# Patient Record
Sex: FEMALE | Race: WHITE | NOT HISPANIC OR LATINO | URBAN - METROPOLITAN AREA
[De-identification: names, ages, dates, MRNs, and addresses within clinical notes are randomized per-mention and may not be internally consistent; named-entity substitution may affect disease eponyms.]

---

## 2017-02-16 ENCOUNTER — OFFICE VISIT (OUTPATIENT)
Dept: URGENT CARE | Facility: URGENT CARE | Age: 57
End: 2017-02-16
Payer: COMMERCIAL

## 2017-02-16 VITALS
HEART RATE: 106 BPM | TEMPERATURE: 99.4 F | HEIGHT: 65 IN | SYSTOLIC BLOOD PRESSURE: 135 MMHG | DIASTOLIC BLOOD PRESSURE: 95 MMHG | BODY MASS INDEX: 27.82 KG/M2 | WEIGHT: 167 LBS | OXYGEN SATURATION: 97 %

## 2017-02-16 DIAGNOSIS — R10.84 ABDOMINAL PAIN, GENERALIZED: Primary | ICD-10-CM

## 2017-02-16 PROBLEM — Z85.3 HX: BREAST CANCER: Status: ACTIVE | Noted: 2017-02-16

## 2017-02-16 PROBLEM — M51.369 DDD (DEGENERATIVE DISC DISEASE), LUMBAR: Status: ACTIVE | Noted: 2017-02-16

## 2017-02-16 PROBLEM — R53.82 CHRONIC FATIGUE: Status: ACTIVE | Noted: 2017-02-16

## 2017-02-16 PROBLEM — M15.0 PRIMARY OSTEOARTHRITIS INVOLVING MULTIPLE JOINTS: Status: ACTIVE | Noted: 2017-02-16

## 2017-02-16 PROBLEM — I10 BENIGN ESSENTIAL HYPERTENSION: Status: ACTIVE | Noted: 2017-02-16

## 2017-02-16 LAB
ALBUMIN UR-MCNC: NEGATIVE MG/DL
APPEARANCE UR: CLEAR
BASOPHILS # BLD AUTO: 0.1 10E9/L (ref 0–0.2)
BASOPHILS NFR BLD AUTO: 0.8 %
BILIRUB UR QL STRIP: NEGATIVE
COLOR UR AUTO: YELLOW
DIFFERENTIAL METHOD BLD: NORMAL
EOSINOPHIL # BLD AUTO: 0.2 10E9/L (ref 0–0.7)
EOSINOPHIL NFR BLD AUTO: 3.1 %
ERYTHROCYTE [DISTWIDTH] IN BLOOD BY AUTOMATED COUNT: 14 % (ref 10–15)
GLUCOSE UR STRIP-MCNC: NEGATIVE MG/DL
HCT VFR BLD AUTO: 40.4 % (ref 35–47)
HGB BLD-MCNC: 13.4 G/DL (ref 11.7–15.7)
HGB UR QL STRIP: NEGATIVE
KETONES UR STRIP-MCNC: NEGATIVE MG/DL
LEUKOCYTE ESTERASE UR QL STRIP: NEGATIVE
LYMPHOCYTES # BLD AUTO: 1.1 10E9/L (ref 0.8–5.3)
LYMPHOCYTES NFR BLD AUTO: 17.2 %
MCH RBC QN AUTO: 30.9 PG (ref 26.5–33)
MCHC RBC AUTO-ENTMCNC: 33.2 G/DL (ref 31.5–36.5)
MCV RBC AUTO: 93 FL (ref 78–100)
MONOCYTES # BLD AUTO: 0.6 10E9/L (ref 0–1.3)
MONOCYTES NFR BLD AUTO: 9.7 %
NEUTROPHILS # BLD AUTO: 4.5 10E9/L (ref 1.6–8.3)
NEUTROPHILS NFR BLD AUTO: 69.2 %
NITRATE UR QL: NEGATIVE
PH UR STRIP: 6 PH (ref 5–7)
PLATELET # BLD AUTO: 265 10E9/L (ref 150–450)
RBC # BLD AUTO: 4.33 10E12/L (ref 3.8–5.2)
SP GR UR STRIP: 1.02 (ref 1–1.03)
URN SPEC COLLECT METH UR: NORMAL
UROBILINOGEN UR STRIP-ACNC: 0.2 EU/DL (ref 0.2–1)
WBC # BLD AUTO: 6.5 10E9/L (ref 4–11)

## 2017-02-16 PROCEDURE — 80053 COMPREHEN METABOLIC PANEL: CPT | Performed by: PHYSICIAN ASSISTANT

## 2017-02-16 PROCEDURE — 36415 COLL VENOUS BLD VENIPUNCTURE: CPT | Performed by: PHYSICIAN ASSISTANT

## 2017-02-16 PROCEDURE — 81003 URINALYSIS AUTO W/O SCOPE: CPT | Performed by: PHYSICIAN ASSISTANT

## 2017-02-16 PROCEDURE — 85025 COMPLETE CBC W/AUTO DIFF WBC: CPT | Performed by: PHYSICIAN ASSISTANT

## 2017-02-16 PROCEDURE — 83690 ASSAY OF LIPASE: CPT | Performed by: PHYSICIAN ASSISTANT

## 2017-02-16 PROCEDURE — 99214 OFFICE O/P EST MOD 30 MIN: CPT | Performed by: PHYSICIAN ASSISTANT

## 2017-02-16 RX ORDER — PYRIDOXINE HCL (VITAMIN B6) 50 MG
50 TABLET ORAL
COMMUNITY

## 2017-02-16 RX ORDER — LISINOPRIL/HYDROCHLOROTHIAZIDE 10-12.5 MG
1 TABLET ORAL
COMMUNITY

## 2017-02-16 RX ORDER — CALCIUM CARBONATE/VITAMIN D3 500-10/5ML
LIQUID (ML) ORAL
COMMUNITY

## 2017-02-16 RX ORDER — MONTELUKAST SODIUM 10 MG/1
10 TABLET ORAL
COMMUNITY

## 2017-02-16 RX ORDER — SENNOSIDES 8.6 MG
CAPSULE ORAL
COMMUNITY

## 2017-02-16 RX ORDER — DILTIAZEM HYDROCHLORIDE 120 MG/1
120 CAPSULE, EXTENDED RELEASE ORAL
COMMUNITY

## 2017-02-16 RX ORDER — LORAZEPAM 1 MG/1
1 TABLET ORAL
COMMUNITY

## 2017-02-16 RX ORDER — LIDOCAINE 50 MG/G
3 PATCH TOPICAL
COMMUNITY

## 2017-02-16 RX ORDER — DEXTROAMPHETAMINE SACCHARATE, AMPHETAMINE ASPARTATE, DEXTROAMPHETAMINE SULFATE AND AMPHETAMINE SULFATE 2.5; 2.5; 2.5; 2.5 MG/1; MG/1; MG/1; MG/1
10 TABLET ORAL
COMMUNITY

## 2017-02-16 RX ORDER — MIRTAZAPINE 45 MG/1
45 TABLET, FILM COATED ORAL
COMMUNITY

## 2017-02-16 RX ORDER — OMEGA-3 FATTY ACIDS/FISH OIL 300-1000MG
200 CAPSULE ORAL
COMMUNITY
Start: 2012-06-27

## 2017-02-16 RX ORDER — MIRTAZAPINE 30 MG/1
45 TABLET, FILM COATED ORAL
COMMUNITY
Start: 2014-12-06

## 2017-02-16 RX ORDER — GABAPENTIN 100 MG/1
200 CAPSULE ORAL
COMMUNITY

## 2017-02-16 RX ORDER — ACETAMINOPHEN 500 MG
500 TABLET ORAL
COMMUNITY
Start: 2012-06-27

## 2017-02-16 RX ORDER — DIAZEPAM 5 MG
5 TABLET ORAL
COMMUNITY

## 2017-02-16 RX ORDER — TRAMADOL HYDROCHLORIDE 50 MG/1
50 TABLET ORAL
COMMUNITY

## 2017-02-16 RX ORDER — OXYCODONE AND ACETAMINOPHEN 5; 325 MG/1; MG/1
1-2 TABLET ORAL
COMMUNITY
Start: 2015-01-15

## 2017-02-16 RX ORDER — LOSARTAN POTASSIUM 25 MG/1
25 TABLET ORAL
COMMUNITY

## 2017-02-16 RX ORDER — DILTIAZEM HYDROCHLORIDE 120 MG/1
120 CAPSULE, COATED, EXTENDED RELEASE ORAL
COMMUNITY
Start: 2016-12-21

## 2017-02-16 ASSESSMENT — ENCOUNTER SYMPTOMS
FOCAL WEAKNESS: 0
FEVER: 0
HEADACHES: 0
BLOOD IN STOOL: 0
FREQUENCY: 0
HEMATURIA: 0
DIARRHEA: 0
NAUSEA: 0
DYSURIA: 0
CONSTIPATION: 0
SHORTNESS OF BREATH: 0
CHILLS: 0
ABDOMINAL PAIN: 1
VOMITING: 0

## 2017-02-16 NOTE — MR AVS SNAPSHOT
After Visit Summary   2/16/2017    Vania Puga    MRN: [id number]           Patient Information     Date Of Birth          1960        Visit Information        Provider Department      2/16/2017 5:15 PM Sharon Nielsen PA-C [facility]        Today's Diagnoses     Abdominal pain, generalized    -  1      Care Instructions      Follow up with primary care in 3-4 days if symptoms have not improved. Return to clinic or go to ER if symptoms worsen.    Abdominal Pain  Abdominal pain is pain in the stomach or intestinal area. Everyone has this pain from time to time. In many cases it goes away on its own. But abdominal pain can sometimes be due to a serious problem, such as appendicitis. So it s important to know when to seek help.  Causes of abdominal pain  There are many possible causes of abdominal pain. Common causes in adults include:    Constipation, diarrhea, or gas    GERD (gastroesophageal reflux disease) movement of stomach acid into the esophagus, also known as acid reflux or heartburn    Peptic ulcer (a sore in the lining of the stomach or small intestine)    Inflammation of the gallbladder, liver, or pancreas    Gallstones or kidney stones    Appendicitis     Obstruction of the intestines     Hernia (bulging of an internal organ through a muscle or other tissue)    Urinary tract infections    In women, menstrual cramps, fibroids, or endometriosis of the uterus    Inflammation or infection of the intestines  Diagnosing the cause of abdominal pain  Your health care provider will examine you to help find the cause of your pain. If needed, tests will be ordered. Because abdominal pain has so many possible causes, it can be hard to discover the reason for the pain. Giving details about your pain can help. Be ready to tell your health care provider where and when you feel the pain and what makes it better or worse. Also mention whether you have other symptoms  such as fever, tiredness, nausea, vomiting, or changes in bathroom habits.  Treating abdominal pain  Certain causes of pain, such as appendicitis or a bowel obstruction, need emergency treatment. Other problems can be treated with rest, fluids, or medications. Your health care provider can give you specific instructions for treatment or self-care based on the cause of your pain.  If you have vomiting or diarrhea, sip water or other clear fluids. When you are ready to eat solid foods again, start with small amounts of easy-to-digest, low-fat foods, such as applesauce, toast, or crackers.   When to call the doctor  Call 911 or go to the hospital right away if you:    Can t pass stool and are vomiting    Are vomiting blood or have black, tarry diarrhea    Also have chest, neck, or shoulder pain    Feel like you are about to pass out    Have pain in your shoulder blades with nausea    Have sudden, excruciating abdominal pain    Have new, severe pain unlike any you have felt before    Have a belly that is rigid, hard, and tender to touch  Call your doctor if you have:    Pain for more than 5 days    Bloating for more than 2 days    Diarrhea for more than 5 days    Fever of 101 F (38.3 C) or higher    Pain that continues to worsen    Unexplained weight loss    Continued lack of appetite    Blood in the stool  How to prevent abdominal pain  Here are some tips to help prevent abdominal pain:    Eat smaller amounts of food at one time.    Avoid greasy, fried, or other high-fat foods.    Avoid foods that give you gas.    Exercise regularly.    Drink plenty of fluids.  To help prevent symptoms of gastroesophageal reflux disease (GERD):    Quit smoking.    Reduce alcohol and certain foods that increase stomach acid.     Lose excess weight.    Finish eating at least 2 hours before you go to bed or lie down.    Elevate the head of your bed.    3475-8326 [employer]. [address] [place] [address], [place] [address]. All  "rights reserved. This information is not intended as a substitute for professional medical care. Always follow your healthcare professional's instructions.              Follow-ups after your visit        Follow-up notes from your care team     Return if symptoms worsen or fail to improve.      Who to contact     If you have questions or need follow up information about today's clinic visit or your schedule please contact [facility] directly at [phone].  Normal or non-critical lab and imaging results will be communicated to you by Zarfohart, letter or phone within 4 business days after the clinic has received the results. If you do not hear from us within 7 days, please contact the clinic through Zarfohart or phone. If you have a critical or abnormal lab result, we will notify you by phone as soon as possible.  Submit refill requests through [employer] or call your pharmacy and they will forward the refill request to us. Please allow 3 business days for your refill to be completed.          Additional Information About Your Visit        Zarfohart Information     [employer] lets you send messages to your doctor, view your test results, renew your prescriptions, schedule appointments and more. To sign up, go to [email or web address] . Click on \"Log in\" on the left side of the screen, which will take you to the Welcome page. Then click on \"Sign up Now\" on the right side of the page.     You will be asked to enter the access code listed below, as well as some personal information. Please follow the directions to create your username and password.     Your access code is: [email or web address]  Expires: 2017  6:52 PM     Your access code will  in 90 days. If you need help or a new code, please call your [place] clinic or [phone].        Care EveryWhere ID     This is your Care EveryWhere ID. This could be used by other organizations to access your [place] medical records  [id number]      " "  Your Vitals Were     Pulse Temperature Height Pulse Oximetry BMI (Body Mass Index)       106 99.4  F (37.4  C) (Tympanic) 5' 4.5\" (1.638 m) 97% 28.22 kg/m2        Blood Pressure from Last 3 Encounters:   02/16/17 (!) 135/95   05/05/07 120/90   07/16/06 110/70    Weight from Last 3 Encounters:   02/16/17 167 lb (75.8 kg)              We Performed the Following     *UA reflex to Microscopic and Culture ([facility] (except [place] and [place])     CBC with platelets differential     Comprehensive metabolic panel     Lipase          Today's Medication Changes          These changes are accurate as of: 2/16/17  6:52 PM.  If you have any questions, ask your nurse or doctor.               These medicines have changed or have updated prescriptions.        Dose/Directions    * omeprazole 20 MG CR capsule   Commonly known as:  priLOSEC   This may have changed:  Another medication with the same name was added. Make sure you understand how and when to take each.   Changed by:  Sharon Nielsen PA-C        Dose:  20 mg   Take 20 mg by mouth   Refills:  0       * omeprazole 20 MG CR capsule   Commonly known as:  priLOSEC   This may have changed:  You were already taking a medication with the same name, and this prescription was added. Make sure you understand how and when to take each.   Used for:  Abdominal pain, generalized   Changed by:  Sharon Nielsen PA-C        Dose:  20 mg   Take 1 capsule (20 mg) by mouth daily Take 30 mins before eating.   Quantity:  30 capsule   Refills:  1       * Notice:  This list has 2 medication(s) that are the same as other medications prescribed for you. Read the directions carefully, and ask your doctor or other care provider to review them with you.         Where to get your medicines      These medications were sent to [facility] [id number] [place] [facility] [place], [place] - [address]  [address] [place] [address]     Phone:  [phone]     " omeprazole 20 MG CR capsule                Primary Care Provider    Doctor Unknown, MD       No address on file        Thank you!     Thank you for choosing [facility]  for your care. Our goal is always to provide you with excellent care. Hearing back from our patients is one way we can continue to improve our services. Please take a few minutes to complete the written survey that you may receive in the mail after your visit with us. Thank you!             Your Updated Medication List - Protect others around you: Learn how to safely use, store and throw away your medicines at www.disposemymeds.org.          This list is accurate as of: 2/16/17  6:52 PM.  Always use your most recent med list.                   Brand Name Dispense Instructions for use    * acetaminophen 650 MG CR tablet    TYLENOL         * acetaminophen 500 MG tablet    TYLENOL     Take 500 mg by mouth       acetaminophen-codeine 300-30 MG per tablet    TYLENOL #3     Take 1-2 tablets by mouth       ALLEGRA PO      2 TABLETS TWICE DAILY       amphetamine-dextroamphetamine 10 MG per tablet    ADDERALL     Take 10 mg by mouth       B Complex Tabs          BUSPAR PO      1 TABLET 3 TIMES DAILY       * CENTAMIN PO      None Entered       * zinc Lozg          CHANTIX PO      1 TABLET DAILY       CLONAZEPAM PO      1 TABLET 3 TIMES DAILY       * D 2000 2000 UNITS tablet   Generic drug:  cholecalciferol      Take 2,000 Units by mouth       * cholecalciferol 5000 UNITS Caps capsule    vitamin D3     Take 5,000 Units by mouth       * cholecalciferol 1000 UNIT tablet    vitamin D     Take 2,000 Units by mouth       diazepam 5 MG tablet    VALIUM     Take 5 mg by mouth       diclofenac 1 % Gel topical gel    VOLTAREN         diltiazem 120 MG 24 hr capsule    DILACOR XR     Take 120 mg by mouth       diltiazem 120 MG 24 hr capsule      Take 120 mg by mouth       EFFEXOR PO      1 TABLET 3 TIMES DAILY WITH FOOD       gabapentin 100 MG  capsule    NEURONTIN     Take 200 mg by mouth       Germanium 10 MG Caps          HYDROcodone-acetaminophen  MG per tablet    NORCO    20    TAKE 1 TO 2 TABLETS EVER 4 TO 6 HOURS AS NEEDED FOR PAIN       ibuprofen 200 MG capsule      Take 200 mg by mouth       lidocaine 5 % Patch    LIDODERM     3 patches       lisinopril-hydrochlorothiazide 10-12.5 MG per tablet    PRINZIDE/ZESTORETIC     Take 1 tablet by mouth       LORazepam 1 MG tablet    ATIVAN     Take 1 mg by mouth       losartan 25 MG tablet    COZAAR     Take 25 mg by mouth       magnesium oxide 400 MG Caps          * mirtazapine 45 MG tablet    REMERON     Take 45 mg by mouth       * mirtazapine 30 MG tablet    REMERON     Take 45 mg by mouth       montelukast 10 MG tablet    SINGULAIR     Take 10 mg by mouth       * omeprazole 20 MG CR capsule    priLOSEC     Take 20 mg by mouth       * omeprazole 20 MG CR capsule    priLOSEC    30 capsule    Take 1 capsule (20 mg) by mouth daily Take 30 mins before eating.       oxyCODONE-acetaminophen 5-325 MG per tablet    PERCOCET     Take 1-2 tablets by mouth       * pyridOXINE 50 MG tablet    VITAMIN B-6     Take 50 mg by mouth       * Vitamin B6 200 MG Tabs          SULINDAC      None Entered       traMADol 50 MG tablet    ULTRAM     Take 50 mg by mouth       Zinc Gluconate 15 MG Tabs      Take 54 mg by mouth       * Notice:  This list has 13 medication(s) that are the same as other medications prescribed for you. Read the directions carefully, and ask your doctor or other care provider to review them with you.

## 2017-02-16 NOTE — PROGRESS NOTES
"HPI  February 16, 2017    HPI: Vania Puga is a 57 year old female who complains of moderate *** onset *** days ago. Pain is constant with intermittent worsening in severity. No known aggravating or alleviating factors, not related to food intake. Ibuprofen with mild relief. Pain does not radiate. No hx similar symptoms previously. Symptoms are constant in duration.. Denies urinary sx, dark/bloody stool, fever/chills, CP, SOB, abd pain, N/V/D, rash, or any other symptoms. Patient denies sick contacts. No recent surgeries.     No past medical history on file.  No past surgical history on file.  Social History   Substance Use Topics     Smoking status: Former Smoker     Quit date: 4/5/2007     Smokeless tobacco: Not on file     Alcohol use Not on file       Problem list, Medication list, Allergies, and Medical/Social/Surgical histories reviewed in [facility] and updated as appropriate.    ROS      Physical Exam    Vital Signs  BP (!) 135/95 (BP Location: Left arm, Patient Position: Chair, Cuff Size: Adult Regular)  Pulse 106  Temp 99.4  F (37.4  C) (Tympanic)  Ht 5' 4.5\" (1.638 m)  Wt 167 lb (75.8 kg)  SpO2 97%  BMI 28.22 kg/m2     {Diagnostic Test Results:563941::\"Diagnostic Test Results:\",\"none \"}    ASSESSMENT/PLAN      ICD-10-CM    1. HX: breast cancer Z85.3             I have discussed any lab or imaging results, the patient's diagnosis, and my plan of treatment with the patient and/or family. Patient is aware to come back in if with worsening symptoms or if no relief despite treatment plan.  Patient voiced understanding and had no further questions.       Follow Up: Data Unavailable    KELLEY Gil, PADilipC  [facility]      "

## 2017-02-16 NOTE — NURSING NOTE
"Chief Complaint   Patient presents with     Urgent Care     Abdominal Pain     c/o upper right abdominal pain for 2 days       Initial BP (!) 135/95 (BP Location: Left arm, Patient Position: Chair, Cuff Size: Adult Regular)  Pulse 106  Temp 99.4  F (37.4  C) (Tympanic)  Ht 5' 4.5\" (1.638 m)  Wt 167 lb (75.8 kg)  SpO2 97%  BMI 28.22 kg/m2 Estimated body mass index is 28.22 kg/(m^2) as calculated from the following:    Height as of this encounter: 5' 4.5\" (1.638 m).    Weight as of this encounter: 167 lb (75.8 kg).  Medication Reconciliation: complete   Nelli Yung MA    "

## 2017-02-16 NOTE — PROGRESS NOTES
HPI  February 16, 2017    HPI: Vania Puga is a 57 year old female who complains of moderate R sided mid abdominal pain onset 2 days ago. Symptoms are constant in duration, with occasional periods of increased intensity. Pain is worse with movement, not affected by food intake. Ibuprofen helps the pain. Pt has hx gastritis and has been taking more ibuprofen recently. Pain does not radiate. No hx similar pain previously. Pt had CT scan of abdomen a few months ago for unrelated reason and was found to have gallstones. Denies dark/bloody stool, urinary sx, fever/chills, HA, CP, SOB, N/V/D/C, rash, or any other symptoms. No recent surgeries.    Past Medical History   Diagnosis Date     Breast cancer (H)      in remission- s/p bilateral mastectomy in 2006     Hypertension      Lumbar degenerative disc disease      Past Surgical History   Procedure Laterality Date     Mastectomy, bilateral  2006     Orthopedic surgery       3 on feet     Rhinoplasty       Social History   Substance Use Topics     Smoking status: Former Smoker     Quit date: 4/5/2007     Smokeless tobacco: Not on file     Alcohol use Not on file       Problem list, Medication list, Allergies, and Medical/Social/Surgical histories reviewed in [facility] and updated as appropriate.      Review of Systems   Constitutional: Negative for chills and fever.   Respiratory: Negative for shortness of breath.    Cardiovascular: Negative for chest pain.   Gastrointestinal: Positive for abdominal pain. Negative for blood in stool, constipation, diarrhea, melena, nausea and vomiting.   Genitourinary: Negative for dysuria, frequency, hematuria and urgency.   Skin: Negative for rash.   Neurological: Negative for focal weakness and headaches.   All other systems reviewed and are negative.        Physical Exam   Constitutional: She is oriented to person, place, and time and well-developed, well-nourished, and in no distress.   HENT:   Head: Normocephalic and atraumatic.  "  Cardiovascular: Normal rate, regular rhythm and normal heart sounds.    Pulmonary/Chest: Effort normal and breath sounds normal.   Abdominal: Soft. Normal appearance. She exhibits no distension and no mass. There is tenderness. There is no rigidity, no rebound and no guarding.       Musculoskeletal: Normal range of motion.   Neurological: She is alert and oriented to person, place, and time. Gait normal.   Skin: Skin is warm and dry.   Nursing note and vitals reviewed.    Vital Signs  BP (!) 135/95 (BP Location: Left arm, Patient Position: Chair, Cuff Size: Adult Regular)  Pulse 106  Temp 99.4  F (37.4  C) (Tympanic)  Ht 5' 4.5\" (1.638 m)  Wt 167 lb (75.8 kg)  SpO2 97%  BMI 28.22 kg/m2     Diagnostic Test Results:  Results for orders placed or performed in visit on 02/16/17 (from the past 24 hour(s))   CBC with platelets differential   Result Value Ref Range    WBC 6.5 4.0 - 11.0 10e9/L    RBC Count 4.33 3.8 - 5.2 10e12/L    Hemoglobin 13.4 11.7 - 15.7 g/dL    Hematocrit 40.4 35.0 - 47.0 %    MCV 93 78 - 100 fl    MCH 30.9 26.5 - 33.0 pg    MCHC 33.2 31.5 - 36.5 g/dL    RDW 14.0 10.0 - 15.0 %    Platelet Count 265 150 - 450 10e9/L    Diff Method Automated Method     % Neutrophils 69.2 %    % Lymphocytes 17.2 %    % Monocytes 9.7 %    % Eosinophils 3.1 %    % Basophils 0.8 %    Absolute Neutrophil 4.5 1.6 - 8.3 10e9/L    Absolute Lymphocytes 1.1 0.8 - 5.3 10e9/L    Absolute Monocytes 0.6 0.0 - 1.3 10e9/L    Absolute Eosinophils 0.2 0.0 - 0.7 10e9/L    Absolute Basophils 0.1 0.0 - 0.2 10e9/L   *UA reflex to Microscopic and Culture ([facility] (except [place] and [place])   Result Value Ref Range    Color Urine Yellow     Appearance Urine Clear     Glucose Urine Negative NEG mg/dL    Bilirubin Urine Negative NEG    Ketones Urine Negative NEG mg/dL    Specific Gravity Urine 1.020 1.003 - 1.035    Blood Urine Negative NEG    pH Urine 6.0 5.0 - 7.0 pH    Protein Albumin Urine Negative NEG " mg/dL    Urobilinogen Urine 0.2 0.2 - 1.0 EU/dL    Nitrite Urine Negative NEG    Leukocyte Esterase Urine Negative NEG    Source Midstream Urine        ASSESSMENT/PLAN      ICD-10-CM    1. Abdominal pain, generalized R10.84 CBC with platelets differential     Comprehensive metabolic panel     Lipase     *UA reflex to Microscopic and Culture ([facility] (except [place] and [place])     omeprazole (PRILOSEC) 20 MG CR capsule      No signs of acute abdomen, afebrile, NAD. UA and CBC negative. CMP, lipase pending. Doubt symptoms due to gallbladder- offered to order ultrasound, pt declines. Possibility symptoms due to gastritis, will start omeprazole and avoid NSAIDs. F/u with PCP if no improvement.      I have discussed any lab or imaging results, the patient's diagnosis, and my plan of treatment with the patient and/or family. Patient is aware to come back in if with worsening symptoms or if no relief despite treatment plan.  Patient voiced understanding and had no further questions.       Follow Up: Return if symptoms worsen or fail to improve.    KELLEY Gil, PA-C  [facility]

## 2017-02-17 LAB
ALBUMIN SERPL-MCNC: 3.8 G/DL (ref 3.4–5)
ALP SERPL-CCNC: 121 U/L (ref 40–150)
ALT SERPL W P-5'-P-CCNC: 82 U/L (ref 0–50)
ANION GAP SERPL CALCULATED.3IONS-SCNC: 6 MMOL/L (ref 3–14)
AST SERPL W P-5'-P-CCNC: 26 U/L (ref 0–45)
BILIRUB SERPL-MCNC: 0.3 MG/DL (ref 0.2–1.3)
BUN SERPL-MCNC: 12 MG/DL (ref 7–30)
CALCIUM SERPL-MCNC: 9 MG/DL (ref 8.5–10.1)
CHLORIDE SERPL-SCNC: 105 MMOL/L (ref 94–109)
CO2 SERPL-SCNC: 29 MMOL/L (ref 20–32)
CREAT SERPL-MCNC: 0.86 MG/DL (ref 0.52–1.04)
GFR SERPL CREATININE-BSD FRML MDRD: 68 ML/MIN/1.7M2
GLUCOSE SERPL-MCNC: 92 MG/DL (ref 70–99)
LIPASE SERPL-CCNC: 163 U/L (ref 73–393)
POTASSIUM SERPL-SCNC: 4.3 MMOL/L (ref 3.4–5.3)
PROT SERPL-MCNC: 7.2 G/DL (ref 6.8–8.8)
SODIUM SERPL-SCNC: 140 MMOL/L (ref 133–144)

## 2017-02-17 NOTE — PATIENT INSTRUCTIONS
Follow up with primary care in 3-4 days if symptoms have not improved. Return to clinic or go to ER if symptoms worsen.    Abdominal Pain  Abdominal pain is pain in the stomach or intestinal area. Everyone has this pain from time to time. In many cases it goes away on its own. But abdominal pain can sometimes be due to a serious problem, such as appendicitis. So it s important to know when to seek help.  Causes of abdominal pain  There are many possible causes of abdominal pain. Common causes in adults include:    Constipation, diarrhea, or gas    GERD (gastroesophageal reflux disease) movement of stomach acid into the esophagus, also known as acid reflux or heartburn    Peptic ulcer (a sore in the lining of the stomach or small intestine)    Inflammation of the gallbladder, liver, or pancreas    Gallstones or kidney stones    Appendicitis     Obstruction of the intestines     Hernia (bulging of an internal organ through a muscle or other tissue)    Urinary tract infections    In women, menstrual cramps, fibroids, or endometriosis of the uterus    Inflammation or infection of the intestines  Diagnosing the cause of abdominal pain  Your health care provider will examine you to help find the cause of your pain. If needed, tests will be ordered. Because abdominal pain has so many possible causes, it can be hard to discover the reason for the pain. Giving details about your pain can help. Be ready to tell your health care provider where and when you feel the pain and what makes it better or worse. Also mention whether you have other symptoms such as fever, tiredness, nausea, vomiting, or changes in bathroom habits.  Treating abdominal pain  Certain causes of pain, such as appendicitis or a bowel obstruction, need emergency treatment. Other problems can be treated with rest, fluids, or medications. Your health care provider can give you specific instructions for treatment or self-care based on the cause of your  pain.  If you have vomiting or diarrhea, sip water or other clear fluids. When you are ready to eat solid foods again, start with small amounts of easy-to-digest, low-fat foods, such as applesauce, toast, or crackers.   When to call the doctor  Call 911 or go to the hospital right away if you:    Can t pass stool and are vomiting    Are vomiting blood or have black, tarry diarrhea    Also have chest, neck, or shoulder pain    Feel like you are about to pass out    Have pain in your shoulder blades with nausea    Have sudden, excruciating abdominal pain    Have new, severe pain unlike any you have felt before    Have a belly that is rigid, hard, and tender to touch  Call your doctor if you have:    Pain for more than 5 days    Bloating for more than 2 days    Diarrhea for more than 5 days    Fever of 101 F (38.3 C) or higher    Pain that continues to worsen    Unexplained weight loss    Continued lack of appetite    Blood in the stool  How to prevent abdominal pain  Here are some tips to help prevent abdominal pain:    Eat smaller amounts of food at one time.    Avoid greasy, fried, or other high-fat foods.    Avoid foods that give you gas.    Exercise regularly.    Drink plenty of fluids.  To help prevent symptoms of gastroesophageal reflux disease (GERD):    Quit smoking.    Reduce alcohol and certain foods that increase stomach acid.     Lose excess weight.    Finish eating at least 2 hours before you go to bed or lie down.    Elevate the head of your bed.    5085-4957 [employer]. [address] [place] [address], [place] [address]. All rights reserved. This information is not intended as a substitute for professional medical care. Always follow your healthcare professional's instructions.

## 2017-02-27 NOTE — PROGRESS NOTES
Chart reviewed.  Encounter was not reviewed with provider.  Patient was not examined by me.  Noris Gamez MD

## 2021-05-26 ENCOUNTER — RECORDS - HEALTHEAST (OUTPATIENT)
Dept: ADMINISTRATIVE | Facility: CLINIC | Age: 61
End: 2021-05-26

## 2021-06-16 ENCOUNTER — OFFICE VISIT (OUTPATIENT)
Dept: FAMILY MEDICINE | Facility: CLINIC | Age: 61
End: 2021-06-16
Payer: OTHER MISCELLANEOUS

## 2021-06-16 VITALS
HEART RATE: 82 BPM | WEIGHT: 182.2 LBS | HEIGHT: 65 IN | SYSTOLIC BLOOD PRESSURE: 128 MMHG | DIASTOLIC BLOOD PRESSURE: 62 MMHG | BODY MASS INDEX: 30.35 KG/M2 | OXYGEN SATURATION: 95 % | TEMPERATURE: 98.1 F

## 2021-06-16 DIAGNOSIS — R06.02 SOB (SHORTNESS OF BREATH): Primary | ICD-10-CM

## 2021-06-16 PROCEDURE — 99203 OFFICE O/P NEW LOW 30 MIN: CPT | Performed by: NURSE PRACTITIONER

## 2021-06-16 RX ORDER — TRAMADOL HYDROCHLORIDE 50 MG/1
TABLET ORAL
COMMUNITY
Start: 2021-06-11

## 2021-06-16 RX ORDER — MIRTAZAPINE 45 MG/1
45 TABLET, FILM COATED ORAL
COMMUNITY
Start: 2021-04-29

## 2021-06-16 RX ORDER — ACETAMINOPHEN 500 MG
5 TABLET ORAL
COMMUNITY

## 2021-06-16 RX ORDER — GABAPENTIN 600 MG/1
600 TABLET ORAL
COMMUNITY
Start: 2021-01-22

## 2021-06-16 RX ORDER — ACETAMINOPHEN 500 MG
TABLET ORAL
COMMUNITY
Start: 2021-01-07

## 2021-06-16 RX ORDER — LOPERAMIDE HYDROCHLORIDE 2 MG/1
CAPSULE ORAL
COMMUNITY
Start: 2020-10-22

## 2021-06-16 RX ORDER — MONTELUKAST SODIUM 10 MG/1
TABLET ORAL
COMMUNITY

## 2021-06-16 RX ORDER — AMLODIPINE BESYLATE 5 MG/1
5 TABLET ORAL
COMMUNITY
Start: 2021-04-18

## 2021-06-16 RX ORDER — DICLOFENAC SODIUM 10 MG/G
GEL TOPICAL
COMMUNITY
Start: 2020-09-11

## 2021-06-16 RX ORDER — BUSPIRONE HYDROCHLORIDE 5 MG/1
5 TABLET ORAL
COMMUNITY
Start: 2021-04-29

## 2021-06-16 RX ORDER — HYDROXYZINE HYDROCHLORIDE 10 MG/1
10-20 TABLET, FILM COATED ORAL
COMMUNITY
Start: 2021-04-29

## 2021-06-16 RX ORDER — CYCLOBENZAPRINE HCL 10 MG
10 TABLET ORAL
COMMUNITY
Start: 2020-09-11

## 2021-06-16 RX ORDER — OMEPRAZOLE 10 MG/1
10 CAPSULE, DELAYED RELEASE ORAL
COMMUNITY

## 2021-06-16 RX ORDER — LEVALBUTEROL TARTRATE 45 UG/1
2 AEROSOL, METERED ORAL
COMMUNITY
Start: 2021-04-07

## 2021-06-16 RX ORDER — FLUTICASONE PROPIONATE AND SALMETEROL 250; 50 UG/1; UG/1
1 POWDER RESPIRATORY (INHALATION) 2 TIMES DAILY
Qty: 60 EACH | Refills: 0 | Status: SHIPPED | OUTPATIENT
Start: 2021-06-16

## 2021-06-16 RX ORDER — LOSARTAN POTASSIUM 50 MG/1
50 TABLET ORAL
COMMUNITY
Start: 2021-05-12

## 2021-06-16 ASSESSMENT — ENCOUNTER SYMPTOMS
VOMITING: 0
SHORTNESS OF BREATH: 0
COUGH: 0
ADENOPATHY: 0
SINUS PAIN: 0
ABDOMINAL PAIN: 0
LIGHT-HEADEDNESS: 1
WHEEZING: 0
HEADACHES: 0
RHINORRHEA: 0
DIZZINESS: 0
CHILLS: 0
SORE THROAT: 0
SINUS PRESSURE: 0
FEVER: 0
CHEST TIGHTNESS: 0
NAUSEA: 0

## 2021-06-16 ASSESSMENT — PAIN SCALES - GENERAL: PAINLEVEL: 2

## 2021-06-16 NOTE — PROGRESS NOTES
CLINIC NOTE  6/16/2021  Provider: Doreen Foster CNP    Subjective:  RADHA  Valery Malin is a 61 y.o. female who is in with concerns regarding shortness of breath.  She apparently is in the area meeting her daughter and they are planning to travel together.  She gives a history of Covid in October 2020.  She had pneumonia and sepsis and was quite ill for an extended period of time.  Reports that she has felt short of breath with any exertion since the infection.  She is sleeping okay, denies cough or fever.  When asked about situations that aggravated her shortness of breath she realized that she usually becomes short of breath during hot weather conditions and with humidity.  Oxygen saturation today is 95%.  Per her report had a full evaluation of her shortness of breath with EKG chest x-ray in April with no abnormalities found.  She carries an albuterol inhaler for episodes of acute shortness of breath.  Problem List:  There are no problems to display for this patient.      Current Outpatient Medications on File Prior to Visit   Medication Sig Dispense Refill   • montelukast (SINGULAIR) 10 mg tablet      • acetaminophen (TYLENOL) 500 mg tablet in clinic     • amLODIPine (NORVASC) 5 mg tablet Take 5 mg by mouth     • busPIRone (BUSPAR) 5 mg tablet Take 5 mg by mouth     • cyclobenzaprine (FLEXERIL) 10 mg tablet Take 10 mg by mouth     • diclofenac sodium (VOLTAREN) 1 % gel Apply topically     • gabapentin (NEURONTIN) 600 mg tablet Take 600 mg by mouth     • hydrOXYzine (ATARAX) 10 mg tablet Take 10-20 mg by mouth     • levalbuterol (XOPENEX HFA) 45 mcg/actuation inhaler Inhale 2 puffs     • loperamide (IMODIUM) 2 mg capsule Take 4mg by mouth with 1st loose stool, then 2mg with each subsequent loose stool. Max 16 mg in 24 hrs     • losartan (COZAAR) 50 mg tablet Take 50 mg by mouth     • melatonin 5 mg tablet Take 5 mg by mouth     • mirtazapine (REMERON) 45 mg tablet Take 45 mg by mouth     • omeprazole (PriLOSEC)  "10 mg capsule Take 10 mg by mouth     • traMADoL (ULTRAM 50) 50 mg tablet Take by mouth       No current facility-administered medications on file prior to visit.       Allergies   Allergen Reactions   • Erythromycin Base      Other reaction(s): Dyspnea  LW Reaction: RESPIRATORY DISTRESS    LW Reaction: RESPIRATORY DISTRESS   • Tetracyclines      Other reaction(s): Dyspnea, Tongue Swelling  LW Reaction: RESPIRATORY DISTRESS    Other reaction(s): Tongue Swelling  Respiratory distress  LW Reaction: RESPIRATORY DISTRESS  PN: LW Reaction: RESPIRATORY DISTRESS  LW Reaction: RESPIRATORY DISTRESS  Respiratory distress   • Duloxetine Hcl Anxiety   • Lactose GI intolerance   • Acetaminophen Other (see comments)     PLEASE DO NOT GIVE DUE TO ELEVATED LIVER ENZYMES AT THIS TIME   • Fructose GI intolerance   • Gluten GI intolerance   • Meperidine GI intolerance   • Morphine Other (see comments)     PN: LW Reaction: physical reaction  Other reaction(s): Other (See Comments)  \"physical reaction\"  PN: LW Reaction: physical reaction  PN: LW Reaction: physical reaction  \"physical reaction\"   • Oxycodone Itching   • Penicillins    • Immune Globulin Rash   • Lamotrigine Rash   • Paroxetine Anxiety   • Sulfa (Sulfonamide Antibiotics) Rash     PN: LW Reaction: Unknown Reaction  PN: LW Reaction: Unknown Reaction    PN: LW Reaction: Unknown Reaction   • Tamoxifen Anxiety       Social History     Socioeconomic History   • Marital status: Single     Spouse name: Not on file   • Number of children: Not on file   • Years of education: Not on file   • Highest education level: Not on file   Occupational History   • Not on file   Tobacco Use   • Smoking status: Former Smoker     Packs/day: 1.00     Types: Cigarettes     Quit date:      Years since quittin.4   • Smokeless tobacco: Never Used   Vaping Use   • Vaping Use: Never used   Substance and Sexual Activity   • Alcohol use: Yes     Alcohol/week: 2.0 standard drinks     Types: 1 " Glasses of wine, 1 Shots of liquor per week     Comment: 2 times a month   • Drug use: Not Currently     Types: Marijuana   • Sexual activity: Not Currently   Other Topics Concern   • Not on file   Social History Narrative    From [place]     Social [facility]     Financial Resource Strain:    • Difficulty of Paying Living Expenses:    Food Insecurity:    • Worried About Running Out of Food in the Last Year:    • Ran Out of Food in the Last Year:    Transportation Needs:    • Lack of Transportation (Medical):    • Lack of Transportation (Non-Medical):    Physical Activity:    • Days of Exercise per Week:    • Minutes of Exercise per Session:    Stress:    • Feeling of Stress :    Social Connections:    • Frequency of Communication with Friends and Family:    • Frequency of Social Gatherings with Friends and Family:    • Attends [employer] Services:    • Active Member of Clubs or Organizations:    • Attends Club or Organization Meetings:    • Marital Status:    Intimate Partner Violence:    • Fear of Current or Ex-Partner:    • Emotionally Abused:    • Physically Abused:    • Sexually Abused:        Family History   Problem Relation Age of Onset   • Depression Mother    • Breast cancer Mother    • Osteoporosis Mother    • Heart disease Father    • Brain cancer Sister    • Bipolar disorder Daughter    • Fibromyalgia Daughter    • Hypermobility Daughter        Review of Systems   Constitutional: Negative for chills and fever.   HENT: Positive for postnasal drip. Negative for rhinorrhea, sinus pressure, sinus pain and sore throat.    Respiratory: Negative for cough, chest tightness, shortness of breath and wheezing.    Cardiovascular: Negative for chest pain.   Gastrointestinal: Negative for abdominal pain, nausea and vomiting.   Skin: Negative for rash.   Neurological: Positive for light-headedness. Negative for dizziness and headaches. Speech difficulty: slight.   Hematological: Negative for adenopathy.  "      Objective:  Vitals:    06/16/21 1400   BP: 128/62   BP Location: Right arm   Patient Position: Sitting   Cuff Size: Regular Adult   Pulse: 82   Temp: 36.7 °C (98.1 °F)   TempSrc: Temporal   SpO2: 95%   Weight: 82.6 kg (182 lb 3.2 oz)   Height: 1.638 m (5' 4.5\")     Physical Exam  Vitals and nursing note reviewed.   Constitutional:       Appearance: She is well-developed.   HENT:      Head: Normocephalic.      Right Ear: External ear normal.      Left Ear: External ear normal.   Eyes:      Conjunctiva/sclera: Conjunctivae normal.   Neck:      Thyroid: No thyromegaly.   Cardiovascular:      Rate and Rhythm: Normal rate and regular rhythm.      Heart sounds: Normal heart sounds. No murmur heard.     Pulmonary:      Effort: Pulmonary effort is normal. No respiratory distress.      Breath sounds: Normal breath sounds. No wheezing or rhonchi.   Abdominal:      Palpations: Abdomen is soft. There is no mass.      Tenderness: There is no abdominal tenderness.   Musculoskeletal:      Cervical back: Normal range of motion. No tenderness.   Lymphadenopathy:      Cervical: No cervical adenopathy.   Skin:     General: Skin is warm and dry.   Neurological:      Mental Status: She is alert and oriented to person, place, and time.   Psychiatric:         Thought Content: Thought content normal.         Laboratory:  No results found for this or any previous visit (from the past 168 hour(s)).    Imaging:  No results found.    Assessment and Plan:  1. SOB (shortness of breath)  Patient is very concerned that she will get short of breath while traveling.  I have prescribed an Advair Diskus to help should she feel that she is having trouble breathing.  She understands that she must go to emergency room if she becomes acutely short of breath.  She did not appear to be short of breath and breath sounds were clear throughout on evaluation today.  She could carry on a normal conversation without breathing heavily was able to walk out of " the exam room without difficulty breathing.  - fluticasone propion-salmeteroL (Advair Diskus) 250-50 mcg/dose diskus inhaler; Inhale 1 puff 2 (two) times a day Rinse mouth with water after use. Do not swallow.  Dispense: 60 each; Refill: 0      On this date of service 30 minutes of total time was spent on this encounter.     Voice recognition program was used to aid in documentation of this record.  Sometimes words are not printed exactly as they were spoken.  While efforts were made to carefully edit and correct any inaccuracies, some errors may be present, please take these into context.

## 2021-06-16 NOTE — PATIENT INSTRUCTIONS
Rinse mouth with water and spit after each use.  Follow up with PCP when you are back at home.  Use albuterol as needed for acute shortness of breath.

## 2022-11-20 ENCOUNTER — HEALTH MAINTENANCE LETTER (OUTPATIENT)
Age: 62
End: 2022-11-20

## 2023-04-24 ENCOUNTER — TRANSFERRED RECORDS (OUTPATIENT)
Dept: HEALTH INFORMATION MANAGEMENT | Facility: CLINIC | Age: 63
End: 2023-04-24
Payer: COMMERCIAL

## 2023-04-27 ENCOUNTER — LAB REQUISITION (OUTPATIENT)
Dept: LAB | Facility: CLINIC | Age: 63
End: 2023-04-27

## 2023-04-27 PROCEDURE — 36415 COLL VENOUS BLD VENIPUNCTURE: CPT | Performed by: FAMILY MEDICINE

## 2023-04-27 PROCEDURE — 86481 TB AG RESPONSE T-CELL SUSP: CPT | Performed by: FAMILY MEDICINE

## 2023-04-28 LAB
QUANTIFERON MITOGEN: 10 IU/ML
QUANTIFERON NIL TUBE: 0 IU/ML
QUANTIFERON TB1 TUBE: 0.02 IU/ML
QUANTIFERON TB2 TUBE: 0.01

## 2023-04-29 LAB
GAMMA INTERFERON BACKGROUND BLD IA-ACNC: 0 IU/ML
M TB IFN-G BLD-IMP: NEGATIVE
M TB IFN-G CD4+ BCKGRND COR BLD-ACNC: 10 IU/ML
MITOGEN IGNF BCKGRD COR BLD-ACNC: 0.01 IU/ML
MITOGEN IGNF BCKGRD COR BLD-ACNC: 0.02 IU/ML

## 2023-05-01 ENCOUNTER — TRANSFERRED RECORDS (OUTPATIENT)
Dept: HEALTH INFORMATION MANAGEMENT | Facility: CLINIC | Age: 63
End: 2023-05-01
Payer: COMMERCIAL

## 2023-07-11 ENCOUNTER — TRANSFERRED RECORDS (OUTPATIENT)
Dept: HEALTH INFORMATION MANAGEMENT | Facility: CLINIC | Age: 63
End: 2023-07-11
Payer: COMMERCIAL

## 2023-09-08 ENCOUNTER — TRANSFERRED RECORDS (OUTPATIENT)
Dept: HEALTH INFORMATION MANAGEMENT | Facility: CLINIC | Age: 63
End: 2023-09-08
Payer: COMMERCIAL

## 2024-01-28 ENCOUNTER — HEALTH MAINTENANCE LETTER (OUTPATIENT)
Age: 64
End: 2024-01-28